# Patient Record
Sex: MALE | Race: WHITE | NOT HISPANIC OR LATINO | URBAN - METROPOLITAN AREA
[De-identification: names, ages, dates, MRNs, and addresses within clinical notes are randomized per-mention and may not be internally consistent; named-entity substitution may affect disease eponyms.]

---

## 2022-02-17 ENCOUNTER — OFFICE (OUTPATIENT)
Dept: URBAN - METROPOLITAN AREA CLINIC 102 | Facility: CLINIC | Age: 87
End: 2022-02-17

## 2022-02-17 VITALS
DIASTOLIC BLOOD PRESSURE: 83 MMHG | TEMPERATURE: 96 F | WEIGHT: 155 LBS | HEART RATE: 84 BPM | HEIGHT: 70 IN | SYSTOLIC BLOOD PRESSURE: 111 MMHG

## 2022-02-17 DIAGNOSIS — R93.5 ABNORMAL FINDINGS ON DIAGNOSTIC IMAGING OF OTHER ABDOMINAL R: ICD-10-CM

## 2022-02-17 DIAGNOSIS — K44.9 DIAPHRAGMATIC HERNIA WITHOUT OBSTRUCTION OR GANGRENE: ICD-10-CM

## 2022-02-17 PROCEDURE — 99204 OFFICE O/P NEW MOD 45 MIN: CPT | Performed by: INTERNAL MEDICINE

## 2022-02-17 RX ORDER — OMEPRAZOLE 40 MG/1
CAPSULE, DELAYED RELEASE ORAL
Qty: 30 | Refills: 2 | Status: ACTIVE
Start: 2022-02-17

## 2022-02-17 NOTE — SERVICENOTES
I personally discussed the procedure with the patient's son in law, including the risks, benefits, and alternatives of EGD. The procedure will be done under pharmacologic sedation. Risks and potential complications of the procedure include, but not limited to: bleeding, infection, bowel perforation, adverse reaction to anesthetics and missed lesions. Biopsy, dilation and/or maneuvers to control bleeding may be performed. Other alternative diagnostic or therapeutic procedure, such as medication treatment, x-ray, and surgery may be available. Another option is to choose no diagnostic exam and/or treatment.

## 2022-02-17 NOTE — SERVICEHPINOTES
89yoM with hx of HTN, dementia, CAD s/p PCI on Plavix referred for evaluation of coffee ground emesis and abnormal CT .
Mark lives in [facility] since October 2021. He present in clinic with his son in law [place] who provided history.ivett Flores presented to the ED 2/15 w/ reports of coffee ground emesis. He has had poor appetite since he had COVID 6 weeks ago. He has lost ~10lbs. No recurrent emesis/hematemesis since his ER visit 2 days ago. No reports of melena, hematochezia, dysphagia or odynophagia. Labs: H/H- 13/39, plt:197 normal CMP, normal CXR, CT angio with moderate size HH and distal esophageal thickening. He has hx of reflux and takes Omeprazole 20mg daily. Prior history of smoking but quit in his 30's. No heavy alcohol use. 
ivett Bowden 10 point review of systems have been reviewed as per HPI and otherwise negative. ivett root

## 2022-03-02 ENCOUNTER — ON CAMPUS - OUTPATIENT (OUTPATIENT)
Dept: URBAN - METROPOLITAN AREA HOSPITAL 16 | Facility: HOSPITAL | Age: 87
End: 2022-03-02
Payer: MEDICARE

## 2022-03-02 DIAGNOSIS — R93.5 ABNORMAL FINDINGS ON DIAGNOSTIC IMAGING OF OTHER ABDOMINAL R: ICD-10-CM

## 2022-03-02 DIAGNOSIS — B37.81 CANDIDAL ESOPHAGITIS: ICD-10-CM

## 2022-03-02 PROCEDURE — 43239 EGD BIOPSY SINGLE/MULTIPLE: CPT | Performed by: INTERNAL MEDICINE
